# Patient Record
(demographics unavailable — no encounter records)

---

## 2025-01-23 NOTE — HISTORY OF PRESENT ILLNESS
[FreeTextEntry1] : NTAALYA MCGHEE is a 41-year-old male, with a PMHx significant for hypercholesterolemia, cervical fusion, cervical discectomy, and a familial hx of ischemic heart disease with father having MI at age 44, who presents today for a follow-up visit. Repeat LDL is 155. Prior CACS in 12/2023 was 0.78. Patient admits to being noncompliant with Rosuvastatin. Otherwise: (-) chest pain, (-) SOB.  Family Hx: (+) Early-onset CAD with father having MI at age 44.

## 2025-01-23 NOTE — CARDIOLOGY SUMMARY
[de-identified] : 06/24/2024: NSR, normal axis, normal intervals, (-) ST-T wave changes. ======================================================= [de-identified] : Cardiac CT - 12/07/2023: Impression: -The Agatston Coronary Calcium Score is 0.78. This is in the 43rd percentile for the patient's age and gender. The coronary calcium score on the previous exam was 0. =======================================================

## 2025-01-23 NOTE — DISCUSSION/SUMMARY
[FreeTextEntry1] : HLD: The impression is hyperlipidemia. Currently, LDL is elevated. Patient admits to noncompliance with Rosuvastatin. Continue current medical therapy. Patient encouraged to take Rosuvastatin at least 3 days per week. Other planned treatment includes diet modification, exercise, and weight loss.  Overweight: Discussed risks of being overweight with the patient. Encouraged continued weight loss. Counselled on weight loss with dietary modification and increased physical activity/exercise.  Instructed to follow up in 6 months.  Plan was discussed with the patient.   I, Dr. Crawford, personally performed the evaluation and management services for this patient. That E&M includes reviewing prior history/tests, conducting the medically appropriate examination and evaluation, assessing all conditions, establishing a plan of care, and counselling and educating the patient. I spent a total of 30 minutes on today's encounter evaluating and treating the patient.

## 2025-01-23 NOTE — CARDIOLOGY SUMMARY
[de-identified] : 06/24/2024: NSR, normal axis, normal intervals, (-) ST-T wave changes. ======================================================= [de-identified] : Cardiac CT - 12/07/2023: Impression: -The Agatston Coronary Calcium Score is 0.78. This is in the 43rd percentile for the patient's age and gender. The coronary calcium score on the previous exam was 0. =======================================================

## 2025-01-23 NOTE — HISTORY OF PRESENT ILLNESS
[FreeTextEntry1] : NATALYA MCGHEE is a 41-year-old male, with a PMHx significant for hypercholesterolemia, cervical fusion, cervical discectomy, and a familial hx of ischemic heart disease with father having MI at age 44, who presents today for a follow-up visit. Repeat LDL is 155. Prior CACS in 12/2023 was 0.78. Patient admits to being noncompliant with Rosuvastatin. Otherwise: (-) chest pain, (-) SOB.  Family Hx: (+) Early-onset CAD with father having MI at age 44.